# Patient Record
Sex: FEMALE | Race: WHITE | NOT HISPANIC OR LATINO
[De-identification: names, ages, dates, MRNs, and addresses within clinical notes are randomized per-mention and may not be internally consistent; named-entity substitution may affect disease eponyms.]

---

## 2018-07-12 ENCOUNTER — LAB SERVICES (OUTPATIENT)
Dept: OTHER | Age: 58
End: 2018-07-12

## 2018-07-12 ENCOUNTER — MYAURORA ACCOUNT LINK (OUTPATIENT)
Dept: OTHER | Age: 58
End: 2018-07-12

## 2018-07-12 ENCOUNTER — CHARTING TRANS (OUTPATIENT)
Dept: OTHER | Age: 58
End: 2018-07-12

## 2018-07-13 ENCOUNTER — IMAGING SERVICES (OUTPATIENT)
Dept: OTHER | Age: 58
End: 2018-07-13

## 2018-07-13 LAB
ALBUMIN SERPL-MCNC: 3.9 G/DL (ref 3.6–5.1)
ALBUMIN/GLOB SERPL: 1.1 (ref 1–2.4)
ALP SERPL-CCNC: 109 UNITS/L (ref 45–117)
ALT SERPL-CCNC: 34 UNITS/L
AMORPH SED URNS QL MICRO: PRESENT
ANION GAP SERPL CALC-SCNC: 16 MMOL/L (ref 10–20)
APPEARANCE UR: ABNORMAL
AST SERPL-CCNC: 28 UNITS/L
BACTERIA #/AREA URNS HPF: ABNORMAL /HPF
BILIRUB SERPL-MCNC: 0.7 MG/DL (ref 0.2–1)
BILIRUB UR QL: NEGATIVE
BUN SERPL-MCNC: 16 MG/DL (ref 6–20)
BUN/CREAT SERPL: 19 (ref 7–25)
CALCIUM SERPL-MCNC: 10 MG/DL (ref 8.4–10.2)
CHLORIDE SERPL-SCNC: 102 MMOL/L (ref 98–107)
CHOLEST SERPL-MCNC: 139 MG/DL
CHOLEST/HDLC SERPL: 2.6
CO2 SERPL-SCNC: 26 MMOL/L (ref 21–32)
COLOR UR: YELLOW
CREAT SERPL-MCNC: 0.85 MG/DL (ref 0.51–0.95)
ERYTHROCYTE [DISTWIDTH] IN BLOOD: 13.5 % (ref 11–15)
GLOBULIN SER-MCNC: 3.6 G/DL (ref 2–4)
GLUCOSE SERPL-MCNC: 82 MG/DL (ref 65–99)
GLUCOSE UR-MCNC: NEGATIVE MG/DL
HDLC SERPL-MCNC: 53 MG/DL
HEMATOCRIT: 51.9 % (ref 36–46.5)
HEMOGLOBIN: 16.3 G/DL (ref 12–15.5)
HYALINE CASTS #/AREA URNS LPF: ABNORMAL /LPF (ref 0–5)
KETONES UR-MCNC: 20 MG/DL
LDLC SERPL CALC-MCNC: 69 MG/DL
LENGTH OF FAST TIME PATIENT: 12 HRS
LENGTH OF FAST TIME PATIENT: 12 HRS
MEAN CORPUSCULAR HEMOGLOBIN: 28.4 PG (ref 26–34)
MEAN CORPUSCULAR HGB CONC: 31.4 G/DL (ref 32–36.5)
MEAN CORPUSCULAR VOLUME: 90.6 FL (ref 78–100)
MUCOUS THREADS URNS QL MICRO: PRESENT
NITRITE UR QL: NEGATIVE
NONHDLC SERPL-MCNC: 86 MG/DL
NRBC (NRBCRE): 0 /100 WBC
PH UR: 5 UNITS (ref 5–7)
PLATELET COUNT: 303 K/MCL (ref 140–450)
POTASSIUM SERPL-SCNC: 4.3 MMOL/L (ref 3.4–5.1)
PROT UR QL: NEGATIVE MG/DL
RBC #/AREA URNS HPF: ABNORMAL /HPF (ref 0–3)
RBC-URINE: NEGATIVE
RED CELL COUNT: 5.73 MIL/MCL (ref 4–5.2)
SODIUM SERPL-SCNC: 140 MMOL/L (ref 135–145)
SP GR UR: 1.02 (ref 1–1.03)
SPECIMEN SOURCE: ABNORMAL
SQUAMOUS #/AREA URNS HPF: ABNORMAL /HPF (ref 0–5)
TOTAL PROTEIN: 7.5 G/DL (ref 6.4–8.2)
TRIGL SERPL-MCNC: 87 MG/DL
TSH SERPL-ACNC: 1.94 MCUNITS/ML (ref 0.35–5)
UROBILINOGEN UR QL: 0.2 MG/DL (ref 0–1)
WBC #/AREA URNS HPF: ABNORMAL /HPF (ref 0–5)
WBC-URINE: ABNORMAL
WHITE BLOOD COUNT: 8.8 K/MCL (ref 4.2–11)

## 2018-07-24 ENCOUNTER — HOSPITAL (OUTPATIENT)
Dept: OTHER | Age: 58
End: 2018-07-24

## 2018-07-24 ENCOUNTER — IMAGING SERVICES (OUTPATIENT)
Dept: OTHER | Age: 58
End: 2018-07-24

## 2018-10-31 VITALS
WEIGHT: 293 LBS | HEART RATE: 111 BPM | SYSTOLIC BLOOD PRESSURE: 145 MMHG | DIASTOLIC BLOOD PRESSURE: 95 MMHG | RESPIRATION RATE: 16 BRPM | HEIGHT: 65 IN | BODY MASS INDEX: 48.82 KG/M2

## 2019-07-30 RX ORDER — CHLORTHALIDONE 25 MG/1
TABLET ORAL
Qty: 30 TABLET | Refills: 0 | Status: SHIPPED | OUTPATIENT
Start: 2019-07-30 | End: 2022-06-08 | Stop reason: SDUPTHER

## 2019-09-30 RX ORDER — CHLORTHALIDONE 25 MG/1
TABLET ORAL
Qty: 90 TABLET | Refills: 0 | OUTPATIENT
Start: 2019-09-30

## 2022-01-10 ENCOUNTER — TELEPHONE (OUTPATIENT)
Dept: SCHEDULING | Age: 62
End: 2022-01-10

## 2022-01-10 DIAGNOSIS — J45.20 MILD INTERMITTENT ASTHMA WITHOUT COMPLICATION: Primary | ICD-10-CM

## 2022-01-10 RX ORDER — ALBUTEROL SULFATE 90 UG/1
2 AEROSOL, METERED RESPIRATORY (INHALATION) EVERY 4 HOURS PRN
Qty: 17 G | Refills: 3 | Status: SHIPPED | OUTPATIENT
Start: 2022-01-10

## 2022-06-03 ENCOUNTER — TELEPHONE (OUTPATIENT)
Dept: SCHEDULING | Age: 62
End: 2022-06-03

## 2022-06-08 ENCOUNTER — IMAGING SERVICES (OUTPATIENT)
Dept: MAMMOGRAPHY | Age: 62
End: 2022-06-08
Attending: INTERNAL MEDICINE

## 2022-06-08 ENCOUNTER — OFFICE VISIT (OUTPATIENT)
Dept: INTERNAL MEDICINE | Age: 62
End: 2022-06-08

## 2022-06-08 ENCOUNTER — LAB SERVICES (OUTPATIENT)
Dept: LAB | Age: 62
End: 2022-06-08

## 2022-06-08 VITALS
HEART RATE: 124 BPM | DIASTOLIC BLOOD PRESSURE: 96 MMHG | SYSTOLIC BLOOD PRESSURE: 170 MMHG | HEIGHT: 66 IN | TEMPERATURE: 97.1 F | BODY MASS INDEX: 47.09 KG/M2 | WEIGHT: 293 LBS

## 2022-06-08 DIAGNOSIS — E66.01 CLASS 3 SEVERE OBESITY DUE TO EXCESS CALORIES WITH SERIOUS COMORBIDITY AND BODY MASS INDEX (BMI) OF 60.0 TO 69.9 IN ADULT (CMD): ICD-10-CM

## 2022-06-08 DIAGNOSIS — Z12.31 VISIT FOR SCREENING MAMMOGRAM: ICD-10-CM

## 2022-06-08 DIAGNOSIS — J45.20 MILD INTERMITTENT ASTHMA WITHOUT COMPLICATION: ICD-10-CM

## 2022-06-08 DIAGNOSIS — I10 ESSENTIAL HYPERTENSION: ICD-10-CM

## 2022-06-08 DIAGNOSIS — U09.9 COVID-19 LONG HAULER: Primary | ICD-10-CM

## 2022-06-08 DIAGNOSIS — Z00.00 BLOOD TESTS FOR ROUTINE GENERAL PHYSICAL EXAMINATION: ICD-10-CM

## 2022-06-08 DIAGNOSIS — Z00.00 ENCOUNTER FOR GENERAL HEALTH EXAMINATION: ICD-10-CM

## 2022-06-08 PROBLEM — E66.813 CLASS 3 SEVERE OBESITY DUE TO EXCESS CALORIES WITH SERIOUS COMORBIDITY AND BODY MASS INDEX (BMI) OF 60.0 TO 69.9 IN ADULT (CMD): Status: ACTIVE | Noted: 2018-07-12

## 2022-06-08 PROBLEM — Z90.49 HISTORY OF CHOLECYSTECTOMY: Status: ACTIVE | Noted: 2022-06-08

## 2022-06-08 PROBLEM — M17.0 PRIMARY OSTEOARTHRITIS OF BOTH KNEES: Status: ACTIVE | Noted: 2018-07-12

## 2022-06-08 PROBLEM — R92.8 ABNORMAL MAMMOGRAM: Status: ACTIVE | Noted: 2018-07-13

## 2022-06-08 PROBLEM — Z12.11 ENCOUNTER FOR SCREENING COLONOSCOPY: Status: ACTIVE | Noted: 2022-06-08

## 2022-06-08 LAB
BASOPHILS # BLD: 0.1 K/MCL (ref 0–0.3)
BASOPHILS NFR BLD: 1 %
DEPRECATED RDW RBC: 43.8 FL (ref 39–50)
EOSINOPHIL # BLD: 0.1 K/MCL (ref 0–0.5)
EOSINOPHIL NFR BLD: 1 %
ERYTHROCYTE [DISTWIDTH] IN BLOOD: 13.4 % (ref 11–15)
HBA1C MFR BLD: 5.4 % (ref 4.5–5.6)
HCT VFR BLD CALC: 46.7 % (ref 36–46.5)
HGB BLD-MCNC: 15.2 G/DL (ref 12–15.5)
IMM GRANULOCYTES # BLD AUTO: 0 K/MCL (ref 0–0.2)
IMM GRANULOCYTES # BLD: 0 %
LYMPHOCYTES # BLD: 1.7 K/MCL (ref 1–4)
LYMPHOCYTES NFR BLD: 22 %
MCH RBC QN AUTO: 29 PG (ref 26–34)
MCHC RBC AUTO-ENTMCNC: 32.5 G/DL (ref 32–36.5)
MCV RBC AUTO: 89 FL (ref 78–100)
MONOCYTES # BLD: 0.5 K/MCL (ref 0.3–0.9)
MONOCYTES NFR BLD: 7 %
NEUTROPHILS # BLD: 5.2 K/MCL (ref 1.8–7.7)
NEUTROPHILS NFR BLD: 69 %
NRBC BLD MANUAL-RTO: 0 /100 WBC
PLATELET # BLD AUTO: 322 K/MCL (ref 140–450)
RBC # BLD: 5.25 MIL/MCL (ref 4–5.2)
WBC # BLD: 7.6 K/MCL (ref 4.2–11)

## 2022-06-08 PROCEDURE — 3077F SYST BP >= 140 MM HG: CPT | Performed by: INTERNAL MEDICINE

## 2022-06-08 PROCEDURE — 99396 PREV VISIT EST AGE 40-64: CPT | Performed by: INTERNAL MEDICINE

## 2022-06-08 PROCEDURE — 80061 LIPID PANEL: CPT | Performed by: INTERNAL MEDICINE

## 2022-06-08 PROCEDURE — 36415 COLL VENOUS BLD VENIPUNCTURE: CPT | Performed by: INTERNAL MEDICINE

## 2022-06-08 PROCEDURE — 80050 GENERAL HEALTH PANEL: CPT | Performed by: INTERNAL MEDICINE

## 2022-06-08 PROCEDURE — 82728 ASSAY OF FERRITIN: CPT | Performed by: INTERNAL MEDICINE

## 2022-06-08 PROCEDURE — 82306 VITAMIN D 25 HYDROXY: CPT | Performed by: INTERNAL MEDICINE

## 2022-06-08 PROCEDURE — 77067 SCR MAMMO BI INCL CAD: CPT | Performed by: RADIOLOGY

## 2022-06-08 PROCEDURE — 83036 HEMOGLOBIN GLYCOSYLATED A1C: CPT | Performed by: INTERNAL MEDICINE

## 2022-06-08 PROCEDURE — 77063 BREAST TOMOSYNTHESIS BI: CPT | Performed by: RADIOLOGY

## 2022-06-08 PROCEDURE — 82607 VITAMIN B-12: CPT | Performed by: INTERNAL MEDICINE

## 2022-06-08 PROCEDURE — 3080F DIAST BP >= 90 MM HG: CPT | Performed by: INTERNAL MEDICINE

## 2022-06-08 RX ORDER — CHLORTHALIDONE 25 MG/1
25 TABLET ORAL DAILY
Qty: 90 TABLET | Refills: 3 | Status: SHIPPED | OUTPATIENT
Start: 2022-06-08

## 2022-06-08 RX ORDER — FLUTICASONE FUROATE AND VILANTEROL 200; 25 UG/1; UG/1
1 POWDER RESPIRATORY (INHALATION) DAILY
Qty: 60 EACH | Refills: 11 | Status: SHIPPED | OUTPATIENT
Start: 2022-06-08

## 2022-06-08 ASSESSMENT — ENCOUNTER SYMPTOMS
VOMITING: 0
BRUISES/BLEEDS EASILY: 0
NAUSEA: 0
SLEEP DISTURBANCE: 0
TREMORS: 0
SHORTNESS OF BREATH: 1
VOICE CHANGE: 0
BLOOD IN STOOL: 0
FATIGUE: 1
CHEST TIGHTNESS: 0
CONSTIPATION: 0
BACK PAIN: 0
ABDOMINAL DISTENTION: 0
APNEA: 0
WHEEZING: 1
ACTIVITY CHANGE: 0
DIARRHEA: 0
APPETITE CHANGE: 1
COUGH: 0
CONFUSION: 0
PHOTOPHOBIA: 0
DIZZINESS: 0
CHILLS: 0
ABDOMINAL PAIN: 0
WEAKNESS: 0
HEADACHES: 0
TROUBLE SWALLOWING: 0
UNEXPECTED WEIGHT CHANGE: 0
COLOR CHANGE: 0
NERVOUS/ANXIOUS: 0
NUMBNESS: 0
FEVER: 0
WOUND: 0
AGITATION: 0
DIAPHORESIS: 0
ADENOPATHY: 0

## 2022-06-08 ASSESSMENT — PATIENT HEALTH QUESTIONNAIRE - PHQ9
SUM OF ALL RESPONSES TO PHQ9 QUESTIONS 1 AND 2: 0
SUM OF ALL RESPONSES TO PHQ9 QUESTIONS 1 AND 2: 0
2. FEELING DOWN, DEPRESSED OR HOPELESS: NOT AT ALL
1. LITTLE INTEREST OR PLEASURE IN DOING THINGS: NOT AT ALL
CLINICAL INTERPRETATION OF PHQ2 SCORE: NO FURTHER SCREENING NEEDED

## 2022-06-09 LAB
25(OH)D3+25(OH)D2 SERPL-MCNC: 45.5 NG/ML (ref 30–100)
ALBUMIN SERPL-MCNC: 3.8 G/DL (ref 3.6–5.1)
ALBUMIN/GLOB SERPL: 1 {RATIO} (ref 1–2.4)
ALP SERPL-CCNC: 105 UNITS/L (ref 45–117)
ALT SERPL-CCNC: 20 UNITS/L
ANION GAP SERPL CALC-SCNC: 14 MMOL/L (ref 7–19)
AST SERPL-CCNC: 17 UNITS/L
BILIRUB SERPL-MCNC: 0.9 MG/DL (ref 0.2–1)
BUN SERPL-MCNC: 16 MG/DL (ref 6–20)
BUN/CREAT SERPL: 21 (ref 7–25)
CALCIUM SERPL-MCNC: 9.6 MG/DL (ref 8.4–10.2)
CHLORIDE SERPL-SCNC: 106 MMOL/L (ref 97–110)
CHOLEST SERPL-MCNC: 128 MG/DL
CHOLEST/HDLC SERPL: 2.3 {RATIO}
CO2 SERPL-SCNC: 24 MMOL/L (ref 21–32)
CREAT SERPL-MCNC: 0.78 MG/DL (ref 0.51–0.95)
FASTING DURATION TIME PATIENT: 12 HOURS (ref 0–999)
FASTING DURATION TIME PATIENT: 12 HOURS (ref 0–999)
FERRITIN SERPL-MCNC: 64 NG/ML (ref 8–252)
GFR SERPLBLD BASED ON 1.73 SQ M-ARVRAT: 87 ML/MIN
GLOBULIN SER-MCNC: 3.7 G/DL (ref 2–4)
GLUCOSE SERPL-MCNC: 94 MG/DL (ref 70–99)
HDLC SERPL-MCNC: 56 MG/DL
LDLC SERPL CALC-MCNC: 55 MG/DL
NONHDLC SERPL-MCNC: 72 MG/DL
POTASSIUM SERPL-SCNC: 4.3 MMOL/L (ref 3.4–5.1)
PROT SERPL-MCNC: 7.5 G/DL (ref 6.4–8.2)
SODIUM SERPL-SCNC: 140 MMOL/L (ref 135–145)
TRIGL SERPL-MCNC: 87 MG/DL
TSH SERPL-ACNC: 1.83 MCUNITS/ML (ref 0.35–5)
VIT B12 SERPL-MCNC: 1059 PG/ML (ref 211–911)

## 2022-06-23 ENCOUNTER — OFFICE VISIT (OUTPATIENT)
Dept: SURGERY | Facility: CLINIC | Age: 62
End: 2022-06-23
Payer: COMMERCIAL

## 2022-06-23 VITALS
HEART RATE: 81 BPM | WEIGHT: 293 LBS | SYSTOLIC BLOOD PRESSURE: 138 MMHG | HEIGHT: 63.5 IN | DIASTOLIC BLOOD PRESSURE: 84 MMHG | OXYGEN SATURATION: 97 % | BODY MASS INDEX: 51.27 KG/M2

## 2022-06-23 DIAGNOSIS — R63.5 WEIGHT GAIN: ICD-10-CM

## 2022-06-23 DIAGNOSIS — R63.2 BINGE EATING: ICD-10-CM

## 2022-06-23 DIAGNOSIS — J45.909 UNCOMPLICATED ASTHMA, UNSPECIFIED ASTHMA SEVERITY, UNSPECIFIED WHETHER PERSISTENT: ICD-10-CM

## 2022-06-23 DIAGNOSIS — I10 HYPERTENSION, UNSPECIFIED TYPE: Primary | ICD-10-CM

## 2022-06-23 DIAGNOSIS — E66.01 MORBID OBESITY WITH BMI OF 70 AND OVER, ADULT (HCC): ICD-10-CM

## 2022-06-23 PROBLEM — I15.9 SECONDARY HYPERTENSION: Status: ACTIVE | Noted: 2022-06-23

## 2022-06-23 RX ORDER — IBUPROFEN 200 MG
TABLET ORAL
COMMUNITY

## 2022-06-23 RX ORDER — ALBUTEROL SULFATE 90 UG/1
AEROSOL, METERED RESPIRATORY (INHALATION)
COMMUNITY
Start: 2022-01-10

## 2022-06-23 RX ORDER — TOPIRAMATE 25 MG/1
25 TABLET ORAL 2 TIMES DAILY
Qty: 60 TABLET | Refills: 1 | Status: SHIPPED | OUTPATIENT
Start: 2022-06-23

## 2022-06-23 RX ORDER — CHLORTHALIDONE 25 MG/1
TABLET ORAL
COMMUNITY
Start: 2022-06-08

## 2022-06-23 NOTE — PATIENT INSTRUCTIONS
Fooducate    Start Vyvanse 30  mg by mouth in the AM.    After 3 weeks, start evening topiramate. Increase to twice a day as tolerated. Get an EKG. To schedule lab appointment:    Call [phone] or schedule online [email or web address]. Magnesium glycinate 200 to 500 mg/day for sleep. Life Extension. NOW. Garden of Life. Doctor's Best.     Or consider Natural Calm. by Mandy Aguilar  Follow dosage instructions. Start 1 teaspoon and increase up to 3 teaspoons as needed for sleep. Daily Calories:  Track food and beverage intake daily on a phone gina: My Fitness Pal, Carb Manager, Lose It, My Net Diary   120-174 lbs: 1200 calories/day. 175-219 lbs: 1500 calories/day. 220-249 lbs: 1800 calories/day. 250 lbs or more: 2,000 calories/day. Record food and drink intake on a written log or phone gina:   Aim for  grams of carbs/day. Aim for a whole, plant strong, low glycemic index dietary pattern. Aim for protein + produce at each meal time. Keep meals between 7 am and 7 pm.    Aim for 3 healthy meals a day with 1-2 healthy snacks as needed. Daily- Aim for:  2 fruits: tomato, avocado, apples, oranges, berries   4 handfuls non-starchy vegetables: greens, peppers, onion, garlic, broccoli, cauliflower, asparagus, brussels sprouts   2 starches: real potato (sweet, white), green beans, carrots, corn, beans, lentils, chickpeas, quinoa, cous cous  3 protein per day: nuts, seeds, plants (lentils, chickpeas, beans), chicken, fish, seafood, turkey, pork, red meat (limited)  Aim for 2 servings healthy fats per day: olives, fatty fish, olive oil, seeds, nuts, avocado, coconut oil. Breakfast ideas:  1. Fruit and nuts/seeds. 2. Eggs scrambled with vegetables, cottage cheese. 3. Oatmeal (stovetop), cinnamon, 2 tbsp flaxseed, berries. 4. Protein shake: 1-2 scoops protein powder, shake with ice and water. Aim for protein and vegetables for lunch and dinner.      Healthy Plate method:   1/2 vegetables, 1/4 protein, 1/4 healthy starch (may decrease this portion). Snacks:  Raw vegetables and hummus, apples and peanut butter, nuts, seeds, fruit, pecans with organic honey drizzled. 1. Do a house cleanse, remove unhealthy foods from the house. 2. Aim to eat a whole, plant strong, low glycemic index diet. 3. Focus on the quality of your diet. 4. Get in the habit of recording everything you eat and drink using a food log.  5. Write down all your meals/drinks to stay accountable for what you eat and drink. 6. Find a regular pattern for meals. Aim for 3 healthy meals a day with 1-2 healthy snacks. 7. Plan when, where, and what you will eat at each meal in advance to make sure you do not go too many hours without eating. 8. Include lean protein throughout the day to help steady your appetite. 9. Eat at least 4 servings of vegetables and 2 servings for fruits each day. 10. Add fiber to slow down digestion and keep you full longer. 11. Cut out sugar sweetened beverages. 12. Avoid highly processed carbohydrates. 13. Use the healthy plate method as a reference: Lunch & Dinner plate: 1/2 vegetables (and some fruit), 1/4 protein, 1/4 healthy starch (may decrease this portion). 14. Aim to lose 1 to 2 lbs per week. 15. Become more physically active. Aim for 150 minutes of moderate/vigorous activity per week. 16. Aim to sleep 7-8 hours per night. 17. Stress less. Meditate. 25. Connect with others, loneliness can contribute to disease. 19. Aim to drink at least 64 oz of water a day, you can increase this to half your body weight in ounces/day. Attend bariatric seminar. To schedule bariatric seminar:   Call [phone] or   Schedule Online at- [email or web address]    After you attend seminar, please reach out to the surgeon's office to have your insurance benefits verified.    The number to call is [phone] and the office can direct you from there.   The surgeon's office will not verify your benefits until you have attended seminar. Please do not call the surgeon until after seminar. GASTRIC BYPASS  80% of patients reach at least 229 lbs 18 months after surgery. 50% of patients reach at least 208 lbs 18 months after surgery. 20% of patients reach 187 lbs 18 months after surgery. SLEEVE GASTRECTOMY  80% of patients reach at least 263 lbs 18 months after surgery. 50% of patients reach at least 239 lbs 18 months after surgery. 20% of patients reach 215 lbs 18 months after surgery.

## 2022-06-25 ENCOUNTER — TELEPHONE (OUTPATIENT)
Dept: SCHEDULING | Age: 62
End: 2022-06-25

## 2023-05-24 PROBLEM — M79.661 PAIN OF RIGHT LOWER LEG: Status: ACTIVE | Noted: 2023-05-24

## 2023-06-09 DIAGNOSIS — Z12.11 ENCOUNTER FOR SCREENING COLONOSCOPY: ICD-10-CM

## 2024-01-23 ENCOUNTER — E-ADVICE (OUTPATIENT)
Dept: FAMILY MEDICINE | Age: 64
End: 2024-01-23

## 2024-03-08 ENCOUNTER — TELEPHONE (OUTPATIENT)
Dept: INTERNAL MEDICINE | Age: 64
End: 2024-03-08

## 2024-03-14 ENCOUNTER — OFFICE VISIT (OUTPATIENT)
Dept: SURGERY | Facility: CLINIC | Age: 64
End: 2024-03-14
Payer: COMMERCIAL

## 2024-03-14 VITALS
HEART RATE: 108 BPM | DIASTOLIC BLOOD PRESSURE: 94 MMHG | OXYGEN SATURATION: 94 % | SYSTOLIC BLOOD PRESSURE: 164 MMHG | BODY MASS INDEX: 80 KG/M2 | WEIGHT: 293 LBS

## 2024-03-14 DIAGNOSIS — Z51.81 ENCOUNTER FOR THERAPEUTIC DRUG MONITORING: ICD-10-CM

## 2024-03-14 DIAGNOSIS — J45.909 UNCOMPLICATED ASTHMA, UNSPECIFIED ASTHMA SEVERITY, UNSPECIFIED WHETHER PERSISTENT (HCC): ICD-10-CM

## 2024-03-14 DIAGNOSIS — E66.01 MORBID OBESITY WITH BMI OF 70 AND OVER, ADULT (HCC): ICD-10-CM

## 2024-03-14 DIAGNOSIS — R63.2 BINGE EATING: ICD-10-CM

## 2024-03-14 DIAGNOSIS — I10 HYPERTENSION, UNSPECIFIED TYPE: Primary | ICD-10-CM

## 2024-03-14 PROCEDURE — 3077F SYST BP >= 140 MM HG: CPT | Performed by: NURSE PRACTITIONER

## 2024-03-14 PROCEDURE — 3080F DIAST BP >= 90 MM HG: CPT | Performed by: NURSE PRACTITIONER

## 2024-03-14 PROCEDURE — 99214 OFFICE O/P EST MOD 30 MIN: CPT | Performed by: NURSE PRACTITIONER

## 2024-03-14 RX ORDER — TOPIRAMATE 25 MG/1
25 TABLET ORAL 2 TIMES DAILY
Qty: 60 TABLET | Refills: 3 | Status: SHIPPED | OUTPATIENT
Start: 2024-03-14

## 2024-03-14 RX ORDER — TIRZEPATIDE 2.5 MG/.5ML
2.5 INJECTION, SOLUTION SUBCUTANEOUS WEEKLY
Qty: 2 ML | Refills: 1 | Status: SHIPPED | OUTPATIENT
Start: 2024-03-14

## 2024-03-14 RX ORDER — LOSARTAN POTASSIUM 50 MG/1
50 TABLET ORAL DAILY
Qty: 30 TABLET | Refills: 3 | Status: SHIPPED | OUTPATIENT
Start: 2024-03-14

## 2024-03-14 NOTE — PATIENT INSTRUCTIONS
Zepbound schedule:    2.5 mg once a week for 4 consecutive weeks, then may increase  5 mg once a week for 4 consecutive weeks, then may increase  7.5 mg once a week for 4 consecutive weeks, then may increase  10 mg once a week for 4 consecutive weeks, then may increase  12.5 mg once a week for 4 consecutive weeks, then may increase  15 mg once a week- final dose     Each month, please message me to let me know how you are doing. I can either refill the medication to a higher dose as stated above, or you will have refills at the current/same dose until you work through the side effects which are typically nausea, vomiting, GI upset, heart burn, constipation, loose stool, and/or fatigue.     Start topiramate 25 mg with dinner. Increase to twice a day as tolerated.     Start losartan 50 mg in the morning.     EasyCopayart Your Health with Group Lifestyle Balance™    Please sign up for one of our free virtual [employer] information sessions to learn more about the program by visiting [email or web address]  or call [phone], option 2 to register.  Attendance at a session is required before starting the program.    Jumpstart Your Health is a year-long lifestyle change program designed to help people lose weight, increase activity and prevent disease. [employer] utilizes Group Lifestyle Balance™,   a research-based curriculum that helps individuals make lasting lifestyle changes to prevent diabetes, cardiovascular disease and develop healthy lifestyle habits.    What to expect  Virtual, 1-hour group classes with a registered dietitian/trained lifestyle   22 total classes over the 1-year program (beginning weekly, then tapering to monthly)  Education on nutrition, physical activity and behavior modification  Self-monitoring of weight, food and activity  A trained lifestyle  to help guide and encourage  you  Support from other participants with the same goals as you  The one-year program fee is $249. The program is not covered by most insurance but it may be eligible for HSA/FSA reimbursement as a weight loss program.    I recommend a whole food, plant powered diet with low glycemic index:     Aim for 3 meals a day and 1-2 snacks as needed.    Aim for a protein + produce at each meal time.     Keep meals between 7 am and 7 pm.     Breakfast ideas:  1. Fruit and nuts/seeds.  2. Eggs scrambled with vegetables.  3. Oatmeal (stovetop), cinnamon, 2 tbsp flaxseed, berries, nuts.  4. Protein shake + fruit. (1 scoop with water/ice). Garden of Life Fit, Nutiva, Ordoñez, and Orgain are good brands.      Snacks:  Raw vegetables and hummus, apples and peanut butter, nuts, seeds, fruit, pecans drizzled with organic honey.      Use the Healthy Plate method for lunch and dinner:  1/2 right side of plate non-starchy vegetables.  Bottom left 1/4 plate protein.  Top left 1/4 starch as desired.      Aim for a total of:  2 fruits a day (avocado, tomato, citrus/oranges, apples, berries)  1/2 cup or medium size    4 non-starchy vegetables (handful) (greens, peppers, onions, garlic, broccoli, cauliflower, etc.)    0-2 starches (oatmeal, sweet potato, carrots, brown rice, etc).    3 protein per day (fish, seafood, meat, or plants: salmon, nuts, seeds, shrimp, chicken, turkey, beans, lentils, chickpeas, etc).    2 healthy fats (avocado, avocado oil, olives, olive oil, salmon, nuts, seeds)       no

## 2024-03-14 NOTE — PROGRESS NOTES
[facility], [facility]  [address]  [place] [address]  Dept: [phone]       Patient:  Hollie Partida  :      1960  MRN:      [id number]    Chief Complaint:    Chief Complaint   Patient presents with    Follow - Up     Weight check    Weight Management       SUBJECTIVE     History of Present Illness:  Hollie is being seen today for a follow-up for weight management.    Last OV 2022.     COVID infection led to many complications since last visit.     Initial HPI:  63 yo female.   Reports \"I've been overweight my whole life.\"  Experienced added weight gain after a fall, s/p right leg fracture and surgical repair.     Patient is considering medications and would consider bariatric surgery for weight loss.    Patient denies any history of eating disorder(s).    Patient is employed: .  Patient lives with mom.    Patient's goal weight: lowest weight 180 lbs, felt good here  Biggest weight loss in the past: 200 lbs and 150 lbs   How weight loss was achieved: Medifast   Heaviest weight ever: Current   Previous use of medical weight loss medications: None     Physical activity:   Limited due to previous leg injury     Sleep: 4-6 hours/night, not consistent    Sleep screening:     Past Medical History:   Past Medical History:   Diagnosis Date    Hypertension         Comorbidities:  Hypertension-Improvement?  no    OBJECTIVE     Vitals: BP (!) 164/94 (BP Location: Right arm)   Pulse 108   Wt (!) 458 lb 9.6 oz (208 kg)   SpO2 94%   BMI 79.96 kg/m²     Initial weight loss: +34   Total weight loss: +34    Start weight: 424    Wt Readings from Last 6 Encounters:   24 (!) 458 lb 9.6 oz (208 kg)   22 (!) 424 lb (192.3 kg)       Patient Medications:    Current Outpatient Medications   Medication Sig Dispense Refill    Tirzepatide-Weight Management (ZEPBOUND) 2.5 MG/0.5ML Subcutaneous Solution  Auto-injector Inject 2.5 mg into the skin once a week. 2 mL 1    topiramate 25 MG Oral Tab Take 1 tablet (25 mg total) by mouth 2 (two) times daily. 60 tablet 3    losartan 50 MG Oral Tab Take 1 tablet (50 mg total) by mouth daily. 30 tablet 3    ibuprofen (ADVIL) 200 MG Oral Tab Advil 200 mg tablet      BREO ELLIPTA 200-25 MCG/INH Inhalation Aerosol Powder, Breath Activated Inhale 1 puff into the lungs daily.      fluticasone furoate-vilanterol 200-25 MCG/INH Inhalation Aerosol Powder, Breath Activated Inhale 1 puff into the lungs daily.      albuterol (VENTOLIN HFA) 108 (90 Base) MCG/ACT Inhalation Aero Soln Ventolin HFA 90 mcg/actuation aerosol inhaler   TAKE TWO PUFFS BY MOUTH EVERY 4 HOURS AS NEEDED FOR SHORTNESS OF BREATH OR WHEEZING      VENTOLIN  (90 Base) MCG/ACT Inhalation Aero Soln TAKE TWO PUFFS BY MOUTH EVERY 4 HOURS AS NEEDED FOR SHORTNESS OF BREATH OR WHEEZING       Allergies:  Demerol hcl [meperidine] and Morphine     Social History: Reviewed     Surgical History:    Past Surgical History:   Procedure Laterality Date    CHOLECYSTECTOMY      OOPHORECTOMY      Bilateral     OTHER      Right leg surgery s/p fall with injury     TONSILLECTOMY       Family History:    Family History   Problem Relation Age of Onset    Obesity Sister      Initial Intake:   2 vegetables/day  1 fruit/day   After dinner behavior: crackers, rice cakes    Night eating: -  Portion sizes: +  Binge: + (sometimes)  Emotional/stress: +  Depression: Score: 9  Grazing: -  Sweet tooth: -  Crunchy/salty: +  Etoh: Rare   Soda Drinker: Yes                 If yes, how much?:  Diet ginger ale every day, all   Sports Drinks:  No                  Juice:  Yes pink lemonade                      Food Journal  Reviewed and Discussed:       Patient has a Food Journal?: no   Patient is reading nutrition labels?  yes  Average Caloric Intake:     Average CHO Intake:   Is patient exercising? no  Type of exercise? Limited mobility     Eating  Habits  Patient states the following:  Eats 2 meal(s) per day   # of snacks per day:    Type of snacks:    Amount of soda consumption per day:  none   Amount of water (in ounces) per day:  limited due to mobility issues   Toughest challenge:      L: yogurt, cottage cheese, apple sauce   D: healthy choice, lean cuisine     Nutritional Goals  Eat 3-4 cups of fresh fruits or vegetables daily    Behavior Modifications Reviewed and Discussed  Eat breakfast, Eat 3 meals per day, Plan meals in advance, Read nutrition labels, Drink 64 oz of water per day, Maintain a daily food journal, Utlize portion control strategies to reduce calorie intake, Identify triggers for eating and manage cues, and Eat slowly and take 20 to 30 minutes to complete each meal    Exercise Goals Reviewed and Discussed    as tolerated     ROS:    Constitutional: positive for fatigue  Respiratory: positive for asthma and dyspnea on exertion  Cardiovascular: positive for lower extremity edema  Gastrointestinal: positive for reflux symptoms  Integument/breast: positive for rash and under pannus  Hematologic/lymphatic: negative  Musculoskeletal:positive for right leg pain   Neurological: negative  Behavioral/Psych: negative  Endocrine: negative  All other systems were reviewed and are negative    Physical Exam:   General appearance: alert, appears stated age, cooperative and obese antalgic gait, ambulates with cane   Head: Normocephalic, without obvious abnormality, atraumatic  Neck: no adenopathy, no carotid bruit, no JVD, supple, symmetrical, trachea midline and thyroid not enlarged, symmetric, no tenderness/mass/nodules  Lungs: clear to auscultation bilaterally  Heart: S1, S2 normal, no murmur, click, rub or gallop, regular rate and rhythm  Abdomen: soft, non-tender; bowel sounds normal; no masses,  no organomegaly and obese; large pannus   Extremities: edema BLE  Pulses: 2+ and symmetric  Skin: Skin color, texture, turgor normal. No rashes or  lesions  Neurologic: Grossly normal    ASSESSMENT       Encounter Diagnosis(ses):   Encounter Diagnoses   Name Primary?    Hypertension, unspecified type Yes    Morbid obesity with BMI of 70 and over, adult ([facility])     Binge eating     Uncomplicated asthma, unspecified asthma severity, unspecified whether persistent ([facility])     Encounter for therapeutic drug monitoring        PLAN         Diagnoses and all orders for this visit:    Hypertension, unspecified type  -     Jump Start Your Health; Future  -     DIETITIAN EDUCATION INITIAL, DIET (INTERNAL)  -     Comp Metabolic Panel (14); Future  -     Hemoglobin A1C; Future  -     Lipid Panel; Future  -     TSH and Free T4; Future  -     losartan 50 MG Oral Tab; Take 1 tablet (50 mg total) by mouth daily.    Morbid obesity with BMI of 70 and over, adult (HCC)  -     Jump Start Your Health; Future  -     Tirzepatide-Weight Management (ZEPBOUND) 2.5 MG/0.5ML Subcutaneous Solution Auto-injector; Inject 2.5 mg into the skin once a week.  -     topiramate 25 MG Oral Tab; Take 1 tablet (25 mg total) by mouth 2 (two) times daily.  -     DIETITIAN EDUCATION INITIAL, DIET (INTERNAL)  -     Comp Metabolic Panel (14); Future  -     Hemoglobin A1C; Future  -     Lipid Panel; Future  -     TSH and Free T4; Future    Binge eating  -     Jump Start Your Health; Future  -     DIETITIAN EDUCATION INITIAL, DIET (INTERNAL)  -     Comp Metabolic Panel (14); Future  -     Hemoglobin A1C; Future  -     Lipid Panel; Future  -     TSH and Free T4; Future    Uncomplicated asthma, unspecified asthma severity, unspecified whether persistent (HCC)  -     Jump Start Your Health; Future  -     DIETITIAN EDUCATION INITIAL, DIET (INTERNAL)  -     Comp Metabolic Panel (14); Future  -     Hemoglobin A1C; Future  -     Lipid Panel; Future  -     TSH and Free T4; Future    Encounter for therapeutic drug monitoring        HYPERTENSION: Blood pressure elevated off medications medications. Plan for losartan  daily.     OBESITY/WEIGHT GAIN:  ASTHMA:     Recommended patient continue intensive lifestyle and behavioral modifications at this time for weight loss.     Reviewed lifestyle modifications: Whole Food/Plant Strong/Low Glycemic Index diet, moderate alcohol consumption, reduced sodium intake to no more than 2,400 mg/day, and at least 150 minutes of moderate physical activity per week.   Avoid processed, poor quality carbohydrates, refined grains, flour, sugar.     Goals for next month:  1. Keep a food log.  2. Drink 64 ounces of non-caloric beverages per day. No fruit juices or regular soda.  3. Aim for 150 minutes moderate exercise per week.    4. Increase fruit and vegetable servings to 5-6 per day.    5. Improve sleep and stress.      Reviewed o/s labs:  6/8/22- CBC, CMP, a1c (5.4), cholesterol, B 12, TSH, ferritin note no significant abnormalities.   Update fasting labs at this time.      Discussed medication options for weight loss in detail with patient.      Denies hx cardiac disease or substance abuse.  Binge Eating vs underfed:     Start topiramate 25 mg with dinner. Increase to BID as tolerated.      Denies personal or family hx medullary thyroid CA, endocrine neoplasia syndrome, pancreatitis hx, suicidal ideation. No renal impairment, severe GI disease, diabetes, pancreatitis risks noted.    Start Zepbound 2.5 mg weekly. Increase dose monthly as tolerated.  Will pay OOP.    SQ administration teaching provided to patient.   Discussed risks, benefits, and side effects of medication. Contraindications for medication discussed at length. Patient states understanding.     Attend seminar if/when ready. Patient would like to lose weight prior to starting bariatric surgery process.      Review sleep study.      Healthy Plate.  IF.  Meet with RD or consider Jump Start, thrives on structure.      Start magnesium to improve sleep.      RTC 2-3 months.     Carolyn Perry, APRN

## 2024-03-15 ENCOUNTER — PATIENT MESSAGE (OUTPATIENT)
Dept: SURGERY | Facility: CLINIC | Age: 64
End: 2024-03-15

## 2024-05-30 ENCOUNTER — OFFICE VISIT (OUTPATIENT)
Dept: SURGERY | Facility: CLINIC | Age: 64
End: 2024-05-30

## 2024-05-30 VITALS
OXYGEN SATURATION: 94 % | BODY MASS INDEX: 51.27 KG/M2 | SYSTOLIC BLOOD PRESSURE: 110 MMHG | HEART RATE: 119 BPM | DIASTOLIC BLOOD PRESSURE: 78 MMHG | WEIGHT: 293 LBS | HEIGHT: 63.5 IN

## 2024-05-30 DIAGNOSIS — R63.2 BINGE EATING: ICD-10-CM

## 2024-05-30 DIAGNOSIS — E66.01 MORBID OBESITY WITH BMI OF 70 AND OVER, ADULT (HCC): ICD-10-CM

## 2024-05-30 DIAGNOSIS — I10 HYPERTENSION, UNSPECIFIED TYPE: Primary | ICD-10-CM

## 2024-05-30 DIAGNOSIS — J45.909 UNCOMPLICATED ASTHMA, UNSPECIFIED ASTHMA SEVERITY, UNSPECIFIED WHETHER PERSISTENT (HCC): ICD-10-CM

## 2024-05-30 DIAGNOSIS — Z51.81 ENCOUNTER FOR THERAPEUTIC DRUG MONITORING: ICD-10-CM

## 2024-05-30 PROCEDURE — 99214 OFFICE O/P EST MOD 30 MIN: CPT | Performed by: NURSE PRACTITIONER

## 2024-05-30 PROCEDURE — 3074F SYST BP LT 130 MM HG: CPT | Performed by: NURSE PRACTITIONER

## 2024-05-30 PROCEDURE — 3008F BODY MASS INDEX DOCD: CPT | Performed by: NURSE PRACTITIONER

## 2024-05-30 PROCEDURE — 3078F DIAST BP <80 MM HG: CPT | Performed by: NURSE PRACTITIONER

## 2024-05-30 RX ORDER — DIETHYLPROPION HYDROCHLORIDE 75 MG/1
1 TABLET ORAL EVERY MORNING
Qty: 30 TABLET | Refills: 3 | Status: SHIPPED | OUTPATIENT
Start: 2024-05-30

## 2024-05-30 RX ORDER — LOSARTAN POTASSIUM 50 MG/1
50 TABLET ORAL DAILY
Qty: 90 TABLET | Refills: 1 | Status: SHIPPED | OUTPATIENT
Start: 2024-05-30

## 2024-05-30 NOTE — PROGRESS NOTES
[facility]  [address] [facility]  [facility] [address]  Dept: [phone]       Patient:  Hollie Partida  :      1960  MRN:      [id number]    Chief Complaint:    Chief Complaint   Patient presents with    Follow - Up    Weight Management    Obesity       SUBJECTIVE     History of Present Illness:  Hollie is being seen today for a follow-up for weight management.    No Zepound coverage.   Stopped topiramate due to side effects- brain fog.     Initial HPI:  61 yo female.   Reports \"I've been overweight my whole life.\"  Experienced added weight gain after a fall, s/p right leg fracture and surgical repair.     Patient is considering medications and would consider bariatric surgery for weight loss.    Patient denies any history of eating disorder(s).    Patient is employed: .  Patient lives with mom.    Patient's goal weight: lowest weight 180 lbs, felt good here  Biggest weight loss in the past: 200 lbs and 150 lbs   How weight loss was achieved: [facility]   Heaviest weight ever: Current   Previous use of medical weight loss medications: None     Physical activity:   Limited due to previous leg injury     Sleep: 4-6 hours/night, not consistent    Sleep screening:     Past Medical History:   Past Medical History:    Hypertension        Comorbidities:  Hypertension-Improvement?  no    OBJECTIVE     Vitals: /78 (BP Location: Right arm, Patient Position: Sitting, Cuff Size: adult)   Pulse 119   Ht 5' 3.5\" (1.613 m)   Wt (!) 420 lb (190.5 kg)   SpO2 94%   BMI 73.23 kg/m²     Initial weight loss: +38   Total weight loss: -04   Start weight: 424    Wt Readings from Last 6 Encounters:   24 (!) 420 lb (190.5 kg)   24 (!) 458 lb 9.6 oz (208 kg)   22 (!) 424 lb (192.3 kg)       Patient Medications:    Current Outpatient Medications   Medication Sig Dispense Refill    Diethylpropion HCl ER 75 MG Oral  Tablet 24 Hr Take 1 tablet (75 mg total) by mouth every morning. Start with 1/2 tablet daily, increase to full tablet daily as tolerated 30 tablet 3    semaglutide-weight management 0.25 MG/0.5ML Subcutaneous Solution Auto-injector Inject 0.5 mL (0.25 mg total) into the skin once a week. 2 mL 1    losartan 50 MG Oral Tab Take 1 tablet (50 mg total) by mouth daily. 90 tablet 1    ibuprofen (ADVIL) 200 MG Oral Tab Advil 200 mg tablet      BREO ELLIPTA 200-25 MCG/INH Inhalation Aerosol Powder, Breath Activated Inhale 1 puff into the lungs daily.      fluticasone furoate-vilanterol 200-25 MCG/INH Inhalation Aerosol Powder, Breath Activated Inhale 1 puff into the lungs daily.      albuterol (VENTOLIN HFA) 108 (90 Base) MCG/ACT Inhalation Aero Soln Ventolin HFA 90 mcg/actuation aerosol inhaler   TAKE TWO PUFFS BY MOUTH EVERY 4 HOURS AS NEEDED FOR SHORTNESS OF BREATH OR WHEEZING      VENTOLIN  (90 Base) MCG/ACT Inhalation Aero Soln TAKE TWO PUFFS BY MOUTH EVERY 4 HOURS AS NEEDED FOR SHORTNESS OF BREATH OR WHEEZING       Allergies:  Demerol hcl [meperidine] and Morphine     Social History: Reviewed     Surgical History:    Past Surgical History:   Procedure Laterality Date    Cholecystectomy      Oophorectomy      Bilateral     Other      Right leg surgery s/p fall with injury     Tonsillectomy       Family History:    Family History   Problem Relation Age of Onset    Obesity Sister      Initial Intake:   2 vegetables/day  1 fruit/day   After dinner behavior: crackers, rice cakes    Night eating: -  Portion sizes: +  Binge: + (sometimes)  Emotional/stress: +  Depression: Score: 9  Grazing: -  Sweet tooth: -  Crunchy/salty: +  Etoh: Rare   Soda Drinker: Yes                 If yes, how much?:  Diet ginger ale every day, all   Sports Drinks:  No                  Juice:  Yes pink lemonade                      Food Journal  Reviewed and Discussed:       Patient has a Food Journal?: no   Patient is reading nutrition  labels?  yes  Average Caloric Intake:     Average CHO Intake:   Is patient exercising? yes  Type of exercise? Limited mobility, but walks to conference room and back at work now; lap around the house     Eating Habits  Patient states the following:  Eats 2 meal(s) per day   # of snacks per day:    Type of snacks:    Amount of soda consumption per day:  none   Amount of water (in ounces) per day:  limited due to mobility issues   Toughest challenge:      IF no food after 7 PM  L: yogurt, cottage cheese, apple sauce, vegetables   D: healthy choice, lean cuisine; bowl, vegetables     Nutritional Goals  Eat 3-4 cups of fresh fruits or vegetables daily    Behavior Modifications Reviewed and Discussed  Eat breakfast, Eat 3 meals per day, Plan meals in advance, Read nutrition labels, Drink 64 oz of water per day, Maintain a daily food journal, Utlize portion control strategies to reduce calorie intake, Identify triggers for eating and manage cues, and Eat slowly and take 20 to 30 minutes to complete each meal    Exercise Goals Reviewed and Discussed    as tolerated     ROS:    Constitutional: positive for fatigue  Respiratory: positive for asthma and dyspnea on exertion  Cardiovascular: positive for lower extremity edema  Gastrointestinal: positive for reflux symptoms  Integument/breast: positive for rash and under pannus  Hematologic/lymphatic: negative  Musculoskeletal:positive for right leg pain   Neurological: negative  Behavioral/Psych: negative  Endocrine: negative  All other systems were reviewed and are negative    Physical Exam:   General appearance: alert, appears stated age, cooperative and obese antalgic gait, ambulates with cane   Head: Normocephalic, without obvious abnormality, atraumatic  Neck: no adenopathy, no carotid bruit, no JVD, supple, symmetrical, trachea midline and thyroid not enlarged, symmetric, no tenderness/mass/nodules  Lungs: clear to auscultation bilaterally  Heart: S1, S2 normal, no  murmur, click, rub or gallop, regular rate and rhythm  Abdomen: soft, non-tender; bowel sounds normal; no masses,  no organomegaly and obese; large pannus   Extremities: edema BLE  Pulses: 2+ and symmetric  Skin: Skin color, texture, turgor normal. No rashes or lesions  Neurologic: Grossly normal    ASSESSMENT       Encounter Diagnosis(ses):   Encounter Diagnoses   Name Primary?    Hypertension, unspecified type Yes    Binge eating     Uncomplicated asthma, unspecified asthma severity, unspecified whether persistent ([facility])     Encounter for therapeutic drug monitoring     Morbid obesity with BMI of 70 and over, adult ([facility])          PLAN         Diagnoses and all orders for this visit:    Hypertension, unspecified type  -     EKG 12 Lead; Future  -     losartan 50 MG Oral Tab; Take 1 tablet (50 mg total) by mouth daily.    Binge eating  -     EKG 12 Lead; Future    Uncomplicated asthma, unspecified asthma severity, unspecified whether persistent ([facility])  -     EKG 12 Lead; Future    Encounter for therapeutic drug monitoring  -     EKG 12 Lead; Future    Morbid obesity with BMI of 70 and over, adult ([facility])  -     EKG 12 Lead; Future  -     Diethylpropion HCl ER 75 MG Oral Tablet 24 Hr; Take 1 tablet (75 mg total) by mouth every morning. Start with 1/2 tablet daily, increase to full tablet daily as tolerated  -     semaglutide-weight management 0.25 MG/0.5ML Subcutaneous Solution Auto-injector; Inject 0.5 mL (0.25 mg total) into the skin once a week.          HYPERTENSION: Blood pressure elevated off medications medications. Plan for losartan daily.     OBESITY/WEIGHT GAIN:  ASTHMA:     Recommended patient continue intensive lifestyle and behavioral modifications at this time for weight loss.     Reviewed lifestyle modifications: Whole Food/Plant Strong/Low Glycemic Index diet, moderate alcohol consumption, reduced sodium intake to no more than 2,400 mg/day, and at least 150 minutes of moderate physical activity per week.    Avoid processed, poor quality carbohydrates, refined grains, flour, sugar.     Goals for next month:  1. Keep a food log.  2. Drink 64 ounces of non-caloric beverages per day. No fruit juices or regular soda.  3. Aim for 150 minutes moderate exercise per week.    4. Increase fruit and vegetable servings to 5-6 per day.    5. Improve sleep and stress.      Reviewed o/s labs:  6/8/22- CBC, CMP, a1c (5.4), cholesterol, B 12, TSH, ferritin note no significant abnormalities.   Update fasting labs at this time.      Discussed medication options for weight loss in detail with patient.      Denies hx cardiac disease or substance abuse.  Binge Eating vs underfed:     Denies personal or family hx medullary thyroid CA, endocrine neoplasia syndrome, pancreatitis hx, suicidal ideation. No renal impairment, severe GI disease, diabetes, pancreatitis risks noted.    No Zepbound coverage. Will check Wegovy coverage at this time.     Consider 1/2 tablet 75 mg diethylpropion after EKG if normal.  Needs EKG.    Consider compounded semaglutide with Dr. José.     Cannot tolerate topiramate- side effect brain fog.     SQ administration teaching provided to patient.   Discussed risks, benefits, and side effects of medication. Contraindications for medication discussed at length. Patient states understanding.     Attend seminar if/when ready. Patient would like to lose weight prior to starting bariatric surgery process.      Review sleep study.      Healthy Plate.  IF.  Meet with RD or consider Jump Start, thrives on structure.      Recommend magnesium to improve sleep.      RTC 4 months.     AL Espinoza

## 2024-07-16 DIAGNOSIS — E66.01 MORBID OBESITY WITH BMI OF 70 AND OVER, ADULT (HCC): ICD-10-CM

## 2024-07-17 RX ORDER — TOPIRAMATE 25 MG/1
25 TABLET ORAL 2 TIMES DAILY
Qty: 60 TABLET | Refills: 3 | OUTPATIENT
Start: 2024-07-17

## 2024-09-05 ENCOUNTER — OFFICE VISIT (OUTPATIENT)
Dept: SURGERY | Facility: CLINIC | Age: 64
End: 2024-09-05
Payer: COMMERCIAL

## 2024-09-05 VITALS
OXYGEN SATURATION: 94 % | BODY MASS INDEX: 51.27 KG/M2 | DIASTOLIC BLOOD PRESSURE: 78 MMHG | SYSTOLIC BLOOD PRESSURE: 124 MMHG | HEIGHT: 63.5 IN | WEIGHT: 293 LBS | HEART RATE: 118 BPM

## 2024-09-05 DIAGNOSIS — R63.2 BINGE EATING: ICD-10-CM

## 2024-09-05 DIAGNOSIS — I10 HYPERTENSION, UNSPECIFIED TYPE: Primary | ICD-10-CM

## 2024-09-05 DIAGNOSIS — J45.909 UNCOMPLICATED ASTHMA, UNSPECIFIED ASTHMA SEVERITY, UNSPECIFIED WHETHER PERSISTENT (HCC): ICD-10-CM

## 2024-09-05 DIAGNOSIS — Z51.81 ENCOUNTER FOR THERAPEUTIC DRUG MONITORING: ICD-10-CM

## 2024-09-05 DIAGNOSIS — E66.01 MORBID OBESITY WITH BMI OF 60.0-69.9, ADULT (HCC): ICD-10-CM

## 2024-09-05 PROCEDURE — 3078F DIAST BP <80 MM HG: CPT | Performed by: NURSE PRACTITIONER

## 2024-09-05 PROCEDURE — 3008F BODY MASS INDEX DOCD: CPT | Performed by: NURSE PRACTITIONER

## 2024-09-05 PROCEDURE — 99214 OFFICE O/P EST MOD 30 MIN: CPT | Performed by: NURSE PRACTITIONER

## 2024-09-05 PROCEDURE — 3074F SYST BP LT 130 MM HG: CPT | Performed by: NURSE PRACTITIONER

## 2024-09-05 RX ORDER — DIETHYLPROPION HYDROCHLORIDE 75 MG/1
1 TABLET, EXTENDED RELEASE ORAL EVERY MORNING
Qty: 30 TABLET | Refills: 3 | Status: SHIPPED | OUTPATIENT
Start: 2024-09-05

## 2024-09-05 RX ORDER — LOSARTAN POTASSIUM 50 MG/1
50 TABLET ORAL DAILY
Qty: 90 TABLET | Refills: 1 | Status: SHIPPED | OUTPATIENT
Start: 2024-09-05

## 2024-09-05 NOTE — PROGRESS NOTES
[facility]  [address]  [facility] [address]  Dept: [phone]       Patient:  Hollie Partida  :      1960  MRN:      [id number]    Chief Complaint:    Chief Complaint   Patient presents with    Follow - Up    Weight Management       SUBJECTIVE     History of Present Illness:  Hollie is being seen today for a follow-up for weight management.    No Zepound or Wegovy coverage.   Stopped topiramate due to side effects- brain fog.     Cannot start Jump Start- time constraints due to work.    Unable to fill diethylpropion- shortage.      Initial HPI:  61 yo female.   Reports \"I've been overweight my whole life.\"  Experienced added weight gain after a fall, s/p right leg fracture and surgical repair.     Patient is considering medications and would consider bariatric surgery for weight loss.    Patient denies any history of eating disorder(s).    Patient is employed: .  Patient lives with mom.    Patient's goal weight: lowest weight 180 lbs, felt good here  Biggest weight loss in the past: 200 lbs and 150 lbs   How weight loss was achieved: [employer]   Heaviest weight ever: Current   Previous use of medical weight loss medications: None     Physical activity:   Limited due to previous leg injury     Sleep: 4-6 hours/night, not consistent    Sleep screening:     Past Medical History:   Past Medical History:    Hypertension        Comorbidities:  Hypertension-Improvement?  no    OBJECTIVE     Vitals: /78 (BP Location: Right arm, Patient Position: Sitting, Cuff Size: adult)   Pulse 118   Ht 5' 3.5\" (1.613 m)   Wt (!) 395 lb (179.2 kg)   SpO2 94%   BMI 68.87 kg/m²     Initial weight loss: -25   Total weight loss: -29   Start weight: 424    Wt Readings from Last 6 Encounters:   24 (!) 395 lb (179.2 kg)   24 (!) 420 lb (190.5 kg)   24 (!) 458 lb 9.6 oz (208 kg)   22 (!) 424 lb  (192.3 kg)       Patient Medications:    Current Outpatient Medications   Medication Sig Dispense Refill    Diethylpropion HCl ER 75 MG Oral Tablet 24 Hr Take 1 tablet (75 mg total) by mouth every morning. Start with 1/2 tablet daily, increase to full tablet daily as tolerated 30 tablet 3    losartan 50 MG Oral Tab Take 1 tablet (50 mg total) by mouth daily. 90 tablet 1    ibuprofen (ADVIL) 200 MG Oral Tab Advil 200 mg tablet      BREO ELLIPTA 200-25 MCG/INH Inhalation Aerosol Powder, Breath Activated Inhale 1 puff into the lungs daily.      fluticasone furoate-vilanterol 200-25 MCG/INH Inhalation Aerosol Powder, Breath Activated Inhale 1 puff into the lungs daily.      albuterol (VENTOLIN HFA) 108 (90 Base) MCG/ACT Inhalation Aero Soln Ventolin HFA 90 mcg/actuation aerosol inhaler   TAKE TWO PUFFS BY MOUTH EVERY 4 HOURS AS NEEDED FOR SHORTNESS OF BREATH OR WHEEZING      VENTOLIN  (90 Base) MCG/ACT Inhalation Aero Soln TAKE TWO PUFFS BY MOUTH EVERY 4 HOURS AS NEEDED FOR SHORTNESS OF BREATH OR WHEEZING       Allergies:  Demerol hcl [meperidine] and Morphine     Social History: Reviewed     Surgical History:    Past Surgical History:   Procedure Laterality Date    Cholecystectomy      Oophorectomy      Bilateral     Other      Right leg surgery s/p fall with injury     Tonsillectomy       Family History:    Family History   Problem Relation Age of Onset    Obesity Sister      Initial Intake:   2 vegetables/day  1 fruit/day   After dinner behavior: crackers, rice cakes    Night eating: -  Portion sizes: +  Binge: + (sometimes)  Emotional/stress: +  Depression: Score: 9  Grazing: -  Sweet tooth: -  Crunchy/salty: +  Etoh: Rare   Soda Drinker: Yes                 If yes, how much?:  Diet ginger ale every day, all   Sports Drinks:  No                  Juice:  Yes pink lemonade                      Food Journal  Reviewed and Discussed:       Patient has a Food Journal?: no   Patient is reading nutrition labels?   yes  Average Caloric Intake:     Average CHO Intake:   Is patient exercising? yes  Type of exercise? Limited mobility, but walks to conference room and back at work now; lap around the house     Eating Habits  Patient states the following:  Eats 2 meal(s) per day   # of snacks per day:    Type of snacks:    Amount of soda consumption per day:  none   Amount of water (in ounces) per day:  limits due to mobility issues   Toughest challenge:      IF no food after 7 PM  L: yogurt, organic cottage cheese, apple sauce, vegetables   D: healthy choice power bowls, lean cuisine; bowl, vegetables     Nutritional Goals  Eat 3-4 cups of fresh fruits or vegetables daily    Behavior Modifications Reviewed and Discussed  Eat breakfast, Eat 3 meals per day, Plan meals in advance, Read nutrition labels, Drink 64 oz of water per day, Maintain a daily food journal, Utlize portion control strategies to reduce calorie intake, Identify triggers for eating and manage cues, and Eat slowly and take 20 to 30 minutes to complete each meal    Exercise Goals Reviewed and Discussed    as tolerated     ROS:    Constitutional: positive for fatigue  Respiratory: positive for asthma and dyspnea on exertion  Cardiovascular: positive for lower extremity edema  Gastrointestinal: positive for reflux symptoms  Integument/breast: positive for rash and under pannus  Hematologic/lymphatic: negative  Musculoskeletal:positive for right leg pain   Neurological: negative  Behavioral/Psych: negative  Endocrine: negative  All other systems were reviewed and are negative    Physical Exam:   General appearance: alert, appears stated age, cooperative and obese antalgic gait, ambulates with cane   Head: Normocephalic, without obvious abnormality, atraumatic  Neck: no adenopathy, no carotid bruit, no JVD, supple, symmetrical, trachea midline and thyroid not enlarged, symmetric, no tenderness/mass/nodules  Lungs: clear to auscultation bilaterally  Heart: S1, S2 normal,  no murmur, click, rub or gallop, regular rate and rhythm  Abdomen: soft, non-tender; bowel sounds normal; no masses,  no organomegaly and obese; large pannus   Extremities: edema BLE  Pulses: 2+ and symmetric  Skin: Skin color, texture, turgor normal. No rashes or lesions  Neurologic: Grossly normal    ASSESSMENT       Encounter Diagnosis(ses):   Encounter Diagnoses   Name Primary?    Hypertension, unspecified type Yes    Binge eating     Uncomplicated asthma, unspecified asthma severity, unspecified whether persistent ([facility])     Encounter for therapeutic drug monitoring     Morbid obesity with BMI of 60.0-69.9, adult ([facility])          PLAN         Diagnoses and all orders for this visit:    Hypertension, unspecified type    Binge eating  -     Diethylpropion HCl ER 75 MG Oral Tablet 24 Hr; Take 1 tablet (75 mg total) by mouth every morning. Start with 1/2 tablet daily, increase to full tablet daily as tolerated    Uncomplicated asthma, unspecified asthma severity, unspecified whether persistent ([facility])    Encounter for therapeutic drug monitoring    Morbid obesity with BMI of 60.0-69.9, adult ([facility])  -     Diethylpropion HCl ER 75 MG Oral Tablet 24 Hr; Take 1 tablet (75 mg total) by mouth every morning. Start with 1/2 tablet daily, increase to full tablet daily as tolerated          HYPERTENSION: Blood pressure elevated off medications medications. Plan for losartan daily.     OBESITY/WEIGHT GAIN:  ASTHMA:     Recommended patient continue intensive lifestyle and behavioral modifications at this time for weight loss.     Reviewed lifestyle modifications: Whole Food/Plant Strong/Low Glycemic Index diet, moderate alcohol consumption, reduced sodium intake to no more than 2,400 mg/day, and at least 150 minutes of moderate physical activity per week.   Avoid processed, poor quality carbohydrates, refined grains, flour, sugar.     Goals for next month:  1. Keep a food log.  2. Drink 64 ounces of non-caloric beverages per day.  No fruit juices or regular soda.  3. Aim for 150 minutes moderate exercise per week.    4. Increase fruit and vegetable servings to 5-6 per day.    5. Improve sleep and stress.      Reviewed o/s labs:  6/8/22- CBC, CMP, a1c (5.4), cholesterol, B 12, TSH, ferritin note no significant abnormalities.   Update fasting labs at this time.      Discussed medication options for weight loss in detail with patient.      Denies hx cardiac disease or substance abuse.  Binge Eating vs underfed:     Denies personal or family hx medullary thyroid CA, endocrine neoplasia syndrome, pancreatitis hx, suicidal ideation. No renal impairment, severe GI disease, diabetes, pancreatitis risks noted.    No Zepbound or Wegovy coverage.     Consider 1/2 tablet 75 mg diethylpropion after EKG if normal.  Needs EKG.    Consider compounded semaglutide with Dr. José.     Cannot tolerate topiramate- side effect brain fog.     SQ administration teaching provided to patient.   Discussed risks, benefits, and side effects of medication. Contraindications for medication discussed at length. Patient states understanding.     Attend seminar if/when ready. Patient would like to lose weight prior to starting bariatric surgery process.      Review sleep study.      Healthy Plate.  IF.  Meet with RD   Cannot fit in Jump Start at this time, thrives on structure.      Recommend magnesium to improve sleep.      RTC 4 months.     AL Espinoza

## 2024-12-15 ENCOUNTER — TELEPHONE (OUTPATIENT)
Dept: INTERNAL MEDICINE | Age: 64
End: 2024-12-15

## 2024-12-15 ENCOUNTER — NURSE TRIAGE (OUTPATIENT)
Dept: TELEHEALTH | Age: 64
End: 2024-12-15

## 2024-12-15 ENCOUNTER — E-ADVICE (OUTPATIENT)
Dept: INTERNAL MEDICINE | Age: 64
End: 2024-12-15

## 2024-12-19 ENCOUNTER — NURSE TRIAGE (OUTPATIENT)
Dept: TELEHEALTH | Age: 64
End: 2024-12-19

## 2024-12-19 ENCOUNTER — TELEPHONE (OUTPATIENT)
Dept: INTERNAL MEDICINE | Age: 64
End: 2024-12-19

## 2024-12-19 DIAGNOSIS — J45.20 MILD INTERMITTENT ASTHMA WITHOUT COMPLICATION (CMD): ICD-10-CM

## 2024-12-19 RX ORDER — ALBUTEROL SULFATE 90 UG/1
2 INHALANT RESPIRATORY (INHALATION) EVERY 4 HOURS PRN
Qty: 17 G | Refills: 3 | Status: SHIPPED | OUTPATIENT
Start: 2024-12-19

## 2025-04-16 ENCOUNTER — TELEMEDICINE (OUTPATIENT)
Dept: SURGERY | Facility: CLINIC | Age: 65
End: 2025-04-16
Payer: COMMERCIAL

## 2025-04-16 VITALS — BODY MASS INDEX: 61 KG/M2 | WEIGHT: 293 LBS

## 2025-04-16 DIAGNOSIS — E66.01 MORBID OBESITY WITH BMI OF 60.0-69.9, ADULT (HCC): ICD-10-CM

## 2025-04-16 DIAGNOSIS — Z51.81 ENCOUNTER FOR THERAPEUTIC DRUG MONITORING: ICD-10-CM

## 2025-04-16 DIAGNOSIS — J45.909 UNCOMPLICATED ASTHMA, UNSPECIFIED ASTHMA SEVERITY, UNSPECIFIED WHETHER PERSISTENT (HCC): ICD-10-CM

## 2025-04-16 DIAGNOSIS — R63.2 BINGE EATING: ICD-10-CM

## 2025-04-16 DIAGNOSIS — I10 HYPERTENSION, UNSPECIFIED TYPE: Primary | ICD-10-CM

## 2025-04-16 PROCEDURE — 98006 SYNCH AUDIO-VIDEO EST MOD 30: CPT | Performed by: NURSE PRACTITIONER

## 2025-04-16 RX ORDER — LOSARTAN POTASSIUM 50 MG/1
50 TABLET ORAL DAILY
Qty: 30 TABLET | Refills: 3 | Status: SHIPPED | OUTPATIENT
Start: 2025-04-16

## 2025-04-16 NOTE — PROGRESS NOTES
Virtual Video & Audio Check-In    Hollie Partida verbally consents to a Virtual Check-In visit on 25.  Patient has been referred to the [facility] website at [email or web address] to review the yearly Consent to Treat document.    Patient understands and accepts financial responsibility for any deductible, co-insurance and/or co-pays associated with this service.    Summary of topics discussed: Obesity/weight management, Lifestyle and behavior modifications, Medication management.             Patient:  Hollie Partida  :      1960  MRN:      [id number]    Chief Complaint:    Chief Complaint   Patient presents with    Follow - Up    Obesity    Weight Management       SUBJECTIVE     History of Present Illness:  Hollie is being seen today for a follow-up for weight management.      Initial HPI:  63 yo female.   Reports \"I've been overweight my whole life.\"  Experienced added weight gain after a fall, s/p right leg fracture and surgical repair.     Patient is considering medications and would consider bariatric surgery for weight loss.    Patient denies any history of eating disorder(s).    Patient is employed: .  Patient lives with mom.    Patient's goal weight: lowest weight 180 lbs, felt good here  Biggest weight loss in the past: 200 lbs and 150 lbs   How weight loss was achieved: Medifast   Heaviest weight ever: Current   Previous use of medical weight loss medications: None     Physical activity:   Limited due to previous leg injury     Sleep: 4-6 hours/night, not consistent    Sleep screening:     Past Medical History:   Past Medical History:    Hypertension        Comorbidities:  Hypertension-Improvement?  no    OBJECTIVE     Vitals: Wt (!) 351 lb (159.2 kg)   BMI 61.20 kg/m²     Initial weight loss: -44   Total weight loss: -73 (-107)   Start weight: 424 (2022)   HW this clinic 458 (3/14/24)    Wt Readings from Last 6 Encounters:   25 (!) 351 lb (159.2 kg)    09/05/24 (!) 395 lb (179.2 kg)   05/30/24 (!) 420 lb (190.5 kg)   03/14/24 (!) 458 lb 9.6 oz (208 kg)   06/23/22 (!) 424 lb (192.3 kg)       Patient Medications:    Current Outpatient Medications   Medication Sig Dispense Refill    losartan 50 MG Oral Tab Take 1 tablet (50 mg total) by mouth daily. 30 tablet 3    ibuprofen (ADVIL) 200 MG Oral Tab Advil 200 mg tablet      BREO ELLIPTA 200-25 MCG/INH Inhalation Aerosol Powder, Breath Activated Inhale 1 puff into the lungs daily.      fluticasone furoate-vilanterol 200-25 MCG/INH Inhalation Aerosol Powder, Breath Activated Inhale 1 puff into the lungs daily.      albuterol (VENTOLIN HFA) 108 (90 Base) MCG/ACT Inhalation Aero Soln Ventolin HFA 90 mcg/actuation aerosol inhaler   TAKE TWO PUFFS BY MOUTH EVERY 4 HOURS AS NEEDED FOR SHORTNESS OF BREATH OR WHEEZING      VENTOLIN  (90 Base) MCG/ACT Inhalation Aero Soln TAKE TWO PUFFS BY MOUTH EVERY 4 HOURS AS NEEDED FOR SHORTNESS OF BREATH OR WHEEZING       Allergies:  Demerol hcl [meperidine] and Morphine     Social History: Reviewed     Surgical History:    Past Surgical History:   Procedure Laterality Date    Cholecystectomy      Oophorectomy      Bilateral     Other      Right leg surgery s/p fall with injury     Tonsillectomy       Family History:    Family History   Problem Relation Age of Onset    Obesity Sister      Initial Intake:   2 vegetables/day  1 fruit/day   After dinner behavior: crackers, rice cakes    Night eating: -  Portion sizes: +  Binge: + (sometimes)  Emotional/stress: +  Depression: Score: 9  Grazing: -  Sweet tooth: -  Crunchy/salty: +  Etoh: Rare   Soda Drinker: Yes                 If yes, how much?:  Diet ginger ale every day, all   Sports Drinks:  No                  Juice:  Yes pink lemonade                      Food Journal  Reviewed and Discussed:       Patient has a Food Journal?: no   Patient is reading nutrition labels?  yes  Average Caloric Intake:     Average CHO Intake:   Is  patient exercising? yes  Type of exercise? Limited mobility, but walks to conference room and back at work now; laps around the house   Improved movement     Eating Habits  Patient states the following:  Eats 2 meal(s) per day   # of snacks per day:  [employer] yogurt bar  Type of snacks:    Amount of soda consumption per day:  none   Amount of water (in ounces) per day:  limits due to mobility issues   Toughest challenge:      IF no food after 7 PM  L: yogurt, organic cottage cheese, apple sauce, vegetables   D: cauliflower rice, vegetable  Collagen peptides    Nutritional Goals  Eat 3-4 cups of fresh fruits or vegetables daily    Behavior Modifications Reviewed and Discussed  Eat breakfast, Eat 3 meals per day, Plan meals in advance, Read nutrition labels, Drink 64 oz of water per day, Maintain a daily food journal, Utlize portion control strategies to reduce calorie intake, Identify triggers for eating and manage cues, and Eat slowly and take 20 to 30 minutes to complete each meal    Exercise Goals Reviewed and Discussed    as tolerated     ROS:    Constitutional: positive for fatigue  Respiratory: positive for asthma and dyspnea on exertion  Cardiovascular: positive for lower extremity edema  Gastrointestinal: positive for reflux symptoms  Integument/breast: positive for rash and under pannus  Hematologic/lymphatic: negative  Musculoskeletal:positive for right leg pain   Neurological: negative  Behavioral/Psych: negative  Endocrine: negative  All other systems were reviewed and are negative    Physical Exam:   General appearance: alert, appears stated age, cooperative and obese antalgic gait, ambulates with cane   Head: Normocephalic, without obvious abnormality, atraumatic  Neck: no adenopathy, no carotid bruit, no JVD, supple, symmetrical, trachea midline and thyroid not enlarged, symmetric, no tenderness/mass/nodules  Lungs: clear to auscultation bilaterally  Heart: S1, S2 normal, no murmur, click, rub or gallop,  regular rate and rhythm  Abdomen: soft, non-tender; bowel sounds normal; no masses,  no organomegaly and obese; large pannus   Extremities: edema BLE  Pulses: 2+ and symmetric  Skin: Skin color, texture, turgor normal. No rashes or lesions  Neurologic: Grossly normal    ASSESSMENT       Encounter Diagnosis(ses):   Encounter Diagnoses   Name Primary?    Hypertension, unspecified type Yes    Binge eating     Encounter for therapeutic drug monitoring     Uncomplicated asthma, unspecified asthma severity, unspecified whether persistent (HCC)     Morbid obesity with BMI of 60.0-69.9, adult ([facility])          PLAN         Diagnoses and all orders for this visit:    Hypertension, unspecified type  -     losartan 50 MG Oral Tab; Take 1 tablet (50 mg total) by mouth daily.    Binge eating    Encounter for therapeutic drug monitoring    Uncomplicated asthma, unspecified asthma severity, unspecified whether persistent (HCC)    Morbid obesity with BMI of 60.0-69.9, adult ([facility])      HYPERTENSION: Blood pressure elevated off medications medications. Plan for losartan daily.     OBESITY/WEIGHT GAIN:  ASTHMA:     Recommended patient continue intensive lifestyle and behavioral modifications at this time for weight loss.     Reviewed lifestyle modifications: Whole Food/Plant Strong/Low Glycemic Index diet, moderate alcohol consumption, reduced sodium intake to no more than 2,400 mg/day, and at least 150 minutes of moderate physical activity per week.   Avoid processed, poor quality carbohydrates, refined grains, flour, sugar.     Goals for next month:  1. Keep a food log.  2. Drink 64 ounces of non-caloric beverages per day. No fruit juices or regular soda.  3. Aim for 150 minutes moderate exercise per week.    4. Increase fruit and vegetable servings to 5-6 per day.    5. Improve sleep and stress.      Reviewed o/s labs:  6/8/22- CBC, CMP, a1c (5.4), cholesterol, B 12, TSH, ferritin note no significant abnormalities.   Update fasting  labs with pcp.      Discussed medication options for weight loss in detail with patient.      Denies hx cardiac disease or substance abuse.  Binge Eating vs underfed:     Denies personal or family hx medullary thyroid CA, endocrine neoplasia syndrome, pancreatitis hx, suicidal ideation. No renal impairment, severe GI disease, diabetes, pancreatitis risks noted.    No Zepbound or Wegovy coverage.     Consider 1/2 tablet 75 mg diethylpropion after EKG if normal.  Needs EKG.  Patient prefers to hold off on medication for weight loss at this time.     Cannot tolerate topiramate- side effect brain fog.     SQ administration teaching provided to patient.   Discussed risks, benefits, and side effects of medication. Contraindications for medication discussed at length. Patient states understanding.     Attend seminar if/when ready. Patient would like to lose weight prior to starting bariatric surgery process.      Review sleep study.      Healthy Plate.  IF.  Meet with RD.   Cannot fit in Jump Start at this time, thrives on structure.      Recommend magnesium to improve sleep.      RTC 4 months.     AL Espinoza